# Patient Record
Sex: FEMALE | Race: WHITE | NOT HISPANIC OR LATINO | Employment: STUDENT | URBAN - METROPOLITAN AREA
[De-identification: names, ages, dates, MRNs, and addresses within clinical notes are randomized per-mention and may not be internally consistent; named-entity substitution may affect disease eponyms.]

---
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
## 2018-01-11 NOTE — PROGRESS NOTES
Assessment   1  Well adult exam (V70 0) (Z00 00)  2  Encounter for routine child health examination with abnormal findings (V20 2) (Z00 121)  3  Acute pharyngitis, unspecified etiology (462) (J02 9)  4  Need for meningococcal vaccination (V03 89) (Z23)  5  Acute streptococcal pharyngitis (034 0) (J02 0)1      1 Amended By: Leonardo Ferguson; Sep 11 2016 1:13 PM EST    Plan  Acute pharyngitis, unspecified etiology    · Start: Cefdinir 250 MG/5ML Oral Suspension Reconstituted; TAKE 6 ML TWICE DAILY  Acute pharyngitis, unspecified etiology, Sore throat    · Rapid StrepA- POC; Source:Throat; Status:Complete;   Done: [id number] 12:59PM  Need for meningococcal vaccination    · Administer: Trumenba Intramuscular Suspension Prefilled Syringe; INJECT 0 5  ML  Intramuscular; To Be Done: [id number]   · Administered: Trumenba Intramuscular Suspension Prefilled Syringe    Discussion/Summary    Impression:   No growth and development concerns  no medical problems  Trumemba given today  Patient is an 23year-old female here for a well visit  Her exam is normal except for a sore throat and swollen glands  She does have a history of "colonizing strep" and had been referred to ENT but had difficulty making an appointment  Her strep screen was positive in our office today, so I did treat her with Cefdinir for 10 days  She did request that the anabiotic be Liquid medicine  I also gave her the name to other ear nose and throat in [facility]  I did fill out her physical form  I discussed vaccinations with the patient and her mother and recommended the Trumemba as she has just started college and is at risk for meningitis  She will get the second Trumemba vaccine in 6 months  Possible side effects of new medications were reviewed with the patient/guardian today  Chief Complaint  patient presents for college physical  Is a freshmen at [employer] and is residing on campus   Patient also with c/o sore throat History of Present Illness  HM, 12-18 years Female (Brief):   General Health:   Dental hygiene: Good  Immunization status: Up to date  Caregiver concerns:   Menses: Menstrual history:  age at menarche was 13  The cycles have been regular  The patient is on an oral contraceptive pill  Nutrition/Elimination:   Sleep:  No sleep issues are reported  Behavior: The child's temperament is described as calm  Health Risks:   Childcare/School:   Sports Participation Questions:   HPI: Patient is here for well visit  Patient has had colonizing Strep in the past and now has a sore throat  Review of Systems    Constitutional: No complaints of fever or chills, feels well, no tiredness, no recent weight gain or loss  Eyes: No complaints of eye pain, no discharge, no eyesight problems, eyes do not itch, no red or dry eyes  ENT: sore throat  Cardiovascular: No complaints of chest pain, no palpitations, normal heart rate, no lower extremity edema  Respiratory: No complaints of cough, no shortness of breath, no wheezing, no leg claudication  Gastrointestinal: No complaints of abdominal pain, no nausea or vomiting, no constipation, no diarrhea or bloody stools  Genitourinary: No complaints of incontinence, no pelvic pain, no dysuria or dysmenorrhea, no abnormal vaginal bleeding or vaginal discharge  Musculoskeletal: No complaints of limb swelling or limb pain, no myalgias, no joint swelling or joint stiffness  Integumentary: No complaints of skin rash, no skin lesions or wounds, no itching, no breast pain, no breast lump  Neurological: No complaints of headache, no numbness or tingling, no confusion, no dizziness, no limb weakness, no convulsions or fainting, no difficulty walking  Psychiatric: No complaints of feeling depressed, no suicidal thoughts, no emotional problems, no anxiety, no sleep disturbances, no change in personality     Endocrine: No complaints of feeling weak, no muscle weakness, no deepening of voice, no hot flashes or proptosis  Active Problems   1  Birth control (V25 9) (Z30 9)    Past Medical History    · History of Abnormal urinalysis (791 9) (R82 90)   · History of Acute upper respiratory infection (465 9) (J06 9)   · History of Arthralgia (719 40) (M25 50)   · History of Blepharitis of right upper eyelid (373 00) (H01 001)   · History of Dysuria (788 1) (R30 0)   · History of Encounter for tuberculin skin test (V74 1) (Z11 1)   · History of Flu vaccine need (V04 81) (Z23)   · History of acne (V13 3) (Z87 2)   · History of acute pharyngitis (V12 69) (Z87 09)   · History of fatigue (V13 89) (U98 040)   · History of headache (V13 89) (E53 041)   · History of streptococcal pharyngitis (V12 09) (Z87 09)   · History of streptococcal pharyngitis (V12 09) (Z87 09)   · History of Hordeolum externum of right eye (373 11) (H00 013)   · History of Pain of lower leg, unspecified laterality (729 5) (M79 669)   · History of Symptoms involving urinary system (788 99) (R39 9)    Surgical History    · Denied: History Of Prior Surgery    Family History  Family History    · Family history of No Significant Family History    Social History    · Denied: History of Drug Use   · Never A Smoker    Current Meds  1  Ortho Tri-Cyclen (28) 0 18/0 215/0 25 MG-35 MCG Oral Tablet; Therapy: (Recorded:06Qdf6471) to Recorded    Allergies   1  No Known Drug Allergies    Vitals   Recorded: 11KIJ7660 81:00QF   Systolic 863, LUE   Diastolic 68, LUE   Heart Rate 56   Temperature 98 4 F   LMP 14Bwf3198   O2 Saturation 98   Height 5 ft 3 5 in   Weight 134 lb    BMI Calculated 23 36   BSA Calculated 1 64   BMI Percentile 70 %   2-20 Stature Percentile 39 %   2-20 Weight Percentile 65 %     Physical Exam    Constitutional - General appearance: No acute distress, well appearing and well nourished  Eyes - Conjunctiva and lids: No injection, edema or discharge   Pupils and irises: Equal, round, reactive to light bilaterally  Ears, Nose, Mouth, and Throat - External inspection of ears and nose: Normal without deformities or discharge  Otoscopic examination: Tympanic membranes gray, translucent with good bony landmarks and light reflex  Canals patent without erythema  Oropharynx: Abnormal  The posterior pharynx was erythematous, but did not have an exudate  Neck - Neck: Supple, symmetric, no masses  Thyroid: No thyromegaly  Pulmonary - Respiratory effort: Normal respiratory rate and rhythm, no increased work of breathing  Auscultation of lungs: Clear bilaterally  Cardiovascular - Auscultation of heart: Regular rate and rhythm, normal S1 and S2, no murmur  Carotid pulses: Normal, 2+ bilaterally  Pedal pulses: Normal, 2+ bilaterally  Examination of extremities for edema and/or varicosities: Normal    Abdomen - Abdomen: Normal bowel sounds, soft, non-tender, no masses  Lymphatic - Palpation of lymph nodes in neck: Abnormal  bilateral anterior cervical node enlargement  Musculoskeletal - Gait and station: Normal gait  Neurologic - Reflexes: Normal    Psychiatric - Orientation to person, place, and time: Normal  Mood and affect: Normal       Procedure    Procedure: Visual Acuity Test    Indication: routine screening  Inforrmation supplied by a Snellen chart  Results: 20/15 in both eyes without corrective device, 20/20 in the right eye without corrective device, 20/20 in the left eye without corrective device normal in both eyes        Signatures   Electronically signed by : Isabella Lesches, DO; Sep 11 2016  1:13PM EST                       (Author)

## 2018-01-12 VITALS
SYSTOLIC BLOOD PRESSURE: 126 MMHG | RESPIRATION RATE: 16 BRPM | HEART RATE: 93 BPM | WEIGHT: 137.5 LBS | BODY MASS INDEX: 23.47 KG/M2 | OXYGEN SATURATION: 99 % | DIASTOLIC BLOOD PRESSURE: 72 MMHG | TEMPERATURE: 98.4 F | HEIGHT: 64 IN

## 2018-01-12 NOTE — PROGRESS NOTES
Assessment    1  Encounter for preventive health examination (V70 0) (Z00 00)   2  Chronic bilateral low back pain without sciatica (724 2,338 29) (M54 5,G89 29)   3  Chronic bilateral thoracic back pain (724 1,338 29) (M54 6,G89 29)   4  Need for vaccination (V05 9) (Z23)    Plan  Chronic bilateral low back pain without sciatica, Chronic bilateral thoracic back pain    · * XR SPINE LUMBAR MINIMUM 4 VIEWS NON INJURY; Status:Active; Requested  for:24Nov2017;    · * XR SPINE THORACIC 3 VIEW; Status:Active; Requested for:24Nov2017;   Chronic bilateral low back pain without sciatica, Chronic bilateral thoracic back pain,  Need for vaccination    · (1) CBC/PLT/DIFF; Status:Active; Requested for:24Nov2017;    · (1) COMPREHENSIVE METABOLIC PANEL; Status:Active; Requested for:24Nov2017;    · (1) LIPID PANEL FASTING W DIRECT LDL REFLEX; Status:Active; Requested  for:24Nov2017;    · (1) TSH WITH FT4 REFLEX; Status:Active; Requested for:24Nov2017;   Need for vaccination    · Fluzone Quadrivalent Intramuscular Suspension; INJECT 0 5  ML  Intramuscular; To Be Done: [id number]    Discussion/Summary    22 yo presents today w/ mother today for PE  overall pt feels well  Her only prescription medication is oral birth control  Her examination today is within normal limits  I gave her an Rx for routine fasting blood work  Will call with results  Flu shot given today    Pt also c/o of low and mid back pain x a few mos  no radiculopathy  no trauma  taking ibu w/ some benefit  Upon examination, patient exhibits prominent paravertebral musculature in her thoracic and lumbar but spine  No scoliosis detected  Will sent for x-rays of lumbar and thoracic spine  Will also give exercise sheet for home  Will call with results  Possible refer to PT  The treatment plan was reviewed with the patient/guardian   The patient/guardian understands and agrees with the treatment plan      Chief Complaint  pt here for her yearly physical and a flu shot also is a re-vac for menactra      History of Present Illness  HPI: 22 yo presents today w/ mother today for PE  overall pt feels well  Pt also c/o of low and mid back pain x a few mos  no radiculopathy  no trauma  taking ibu w/ some benefit  Review of Systems    Constitutional: No fever, no chills, feels well, no tiredness, no recent weight gain or weight loss  Cardiovascular: No complaints of slow heart rate, no fast heart rate, no chest pain, no palpitations, no leg claudication, no lower extremity edema  Respiratory: No complaints of shortness of breath, no wheezing, no cough, no SOB on exertion, no orthopnea, no PND  Gastrointestinal: No complaints of abdominal pain, no constipation, no nausea or vomiting, no diarrhea, no bloody stools  Genitourinary: No complaints of dysuria, no incontinence, no pelvic pain, no dysmenorrhea, no vaginal discharge or bleeding  Musculoskeletal: as noted in HPI  Active Problems    1  Acute pharyngitis, unspecified etiology (462) (J02 9)   2  Birth control (V25 9) (Z30 9)   3  Depression screening (V79 0) (Z13 89)   4  Flu vaccine need (V04 81) (Z23)   5  Need for revaccination (V05 9) (Z23)   6   Well adult exam (V70 0) (Z00 00)    Past Medical History    · History of Abnormal urinalysis (791 9) (R82 90)   · History of Acute pharyngitis, unspecified etiology (462) (J02 9)   · History of Acute upper respiratory infection (465 9) (J06 9)   · History of Arthralgia (719 40) (M25 50)   · History of Blepharitis of right upper eyelid (373 00) (H01 001)   · History of Dysuria (788 1) (R30 0)   · History of Encounter for routine child health examination with abnormal findings (V20 2)  (Z00 121)   · History of Encounter for tuberculin skin test (V74 1) (Z11 1)   · History of Flu vaccine need (V04 81) (Z23)   · History of acne (V13 3) (Z87 2)   · History of acute pharyngitis (V12 69) (Z87 09)   · History of fatigue (V13 89) (V35 782)   · History of headache (V18 21) (D38 589)   · History of streptococcal pharyngitis (V12 09) (Z87 09)   · History of streptococcal pharyngitis (V12 09) (Z87 09)   · History of Hordeolum externum of right eye (373 11) (H00 013)   · History of Need for meningococcal vaccination (V03 89) (Z23)   · History of Pain of lower leg, unspecified laterality (729 5) (M79 669)   · History of Sore throat (462) (J02 9)   · History of Symptoms involving urinary system (788 99) (R39 9)    Surgical History    · Denied: History Of Prior Surgery    Family History  Mother    · Denied: Family history of substance abuse   · Family history of thyroiditis (V18 19) (Z83 49)   · Denied: Family history of Mental health problem  Father    · Denied: Family history of substance abuse   · Denied: Family history of Mental health problem  Family History    · Family history of No Significant Family History    Social History    · Always uses seat belt   · Daily caffeine consumption, 2-3 servings a day   · Denied: History of Drug Use   · Feels safe at home   · Never A Smoker   · No alcohol use    Current Meds   1  Ortho Tri-Cyclen (28) 0 18/0 215/0 25 MG-35 MCG Oral Tablet; Therapy: (Recorded:44Nxj8506) to Recorded    Allergies    1  No Known Drug Allergies    Physical Exam    Constitutional   General appearance: No acute distress, well appearing and well nourished  Ears, Nose, Mouth, and Throat   Oropharynx: Normal with no erythema, edema, exudate or lesions  Pulmonary   Respiratory effort: No increased work of breathing or signs of respiratory distress  Auscultation of lungs: Clear to auscultation  Cardiovascular   Palpation of heart: Normal PMI, no thrills  Auscultation of heart: Normal rate and rhythm, normal S1 and S2, without murmurs  Examination of extremities for edema and/or varicosities: Normal     Abdomen   Abdomen: Non-tender, no masses  Liver and spleen: No hepatomegaly or splenomegaly      Lymphatic   Palpation of lymph nodes in neck: No lymphadenopathy  Musculoskeletal   Gait and station: Normal     Inspection/palpation of joints, bones, and muscles: Abnormal   Lumbar and thoracic spine: Prominent paravertebral musculature  Essentially full  Range of motion negative straight leg raising  Signatures   Electronically signed by :  Igor Reyna DO; Nov 24 2017 11:33AM EST                       (Author)

## 2018-01-22 VITALS
WEIGHT: 132.13 LBS | OXYGEN SATURATION: 98 % | DIASTOLIC BLOOD PRESSURE: 80 MMHG | HEIGHT: 62 IN | RESPIRATION RATE: 17 BRPM | TEMPERATURE: 98.2 F | BODY MASS INDEX: 24.31 KG/M2 | HEART RATE: 92 BPM | SYSTOLIC BLOOD PRESSURE: 118 MMHG

## 2018-01-23 NOTE — RESULT NOTES
Verified Results  * XR SPINE LUMBAR MINIMUM 4 VIEWS NON INJURY [id number] 01:44PM Wendie Gray Order Number: [id number]     Test Name Result Flag Reference   XR SPINE LUMBAR MINIMUM 4 VIEWS (Report)     LUMBAR SPINE     INDICATION: M54 5: Low back pain   M54 6: Pain in thoracic spine  History taken directly from the electronic ordering system  COMPARISON: None     VIEWS: AP, lateral, bilateral oblique and coned down projections     IMAGES: 5     FINDINGS:     Alignment is unremarkable  Mild lumbar dextroscoliosis  There is no radiographic evidence of acute fracture or destructive osseous lesion  No significant lumbar degenerative change noted  Visualized soft tissues appear unremarkable  IMPRESSION:     Essentially normal study (mild lumbar dextroscoliosis)  Workstation performed: [id number]     Signed by:   Marcela Mahan MD   12/22/17     * XR SPINE THORACIC 3 VIEW [id number] 01:44PM Wendie Gray Order Number: [id number]     Test Name Result Flag Reference   XR SPINE THORACIC 3 VW (Report)     THORACIC SPINE     INDICATION: M54 5: Low back pain   M54 6: Pain in thoracic spine  History taken directly from the electronic ordering system  COMPARISON: None     VIEWS: AP, lateral and coned-down projections     IMAGES: 3     FINDINGS:     Thoracic vertebrae demonstrate normal stature and alignment  Mild thoracolumbar levoscoliosis  There is no fracture or pathologic bone lesion  There is no displacement of the paraspinal line  The pedicles are intact  IMPRESSION:     Thoracolumbar levoscoliosis  Workstation performed: [id number]     Signed by:   Marcela Mahan MD   12/22/17

## 2018-01-24 VITALS
HEART RATE: 90 BPM | WEIGHT: 130.19 LBS | BODY MASS INDEX: 23.81 KG/M2 | TEMPERATURE: 98.6 F | DIASTOLIC BLOOD PRESSURE: 82 MMHG | SYSTOLIC BLOOD PRESSURE: 118 MMHG | OXYGEN SATURATION: 98 % | RESPIRATION RATE: 16 BRPM

## 2018-03-07 NOTE — PROGRESS NOTES
History of Present Illness    Revaccination   Vaccine Information: Vaccine(s) Given (names): Menactra  Unable to reach by phone  Spoke with family regarding vaccine out of temperature range and risks and benefits of revaccination  Action(s): Pt will be revaccinated  Appointment scheduled: [id number]  Pt called (attempt 1): [id number] 1400 JLR  Pt called (attempt 2): [id number] 4075 JLR  LMOM  Pt called (attempt 3): [id number]  LMOM  Letter Sent (Regular and Certified): [id number] [facility]  No Show Appt(s): [id number]  Revaccination Completed: [id number]  Active Problems    1  Birth control (V25 9) (Z30 9)   2  Flu vaccine need (V04 81) (Z23)   3  Need for revaccination (V05 9) (Z23)   4  Well adult exam (V70 0) (Z00 00)    Immunizations  DTP/DTaP --- Madhavi Alejandre: 72-IJW-0826; Series2: 1998; Hemalatha Ro: 12-Dic-9790Bpftlxj Carter:  08-Oct-2001; Series5: 17-Sep-2003   Hepatitis B --- Madhavi Forte: 1998; Series2: 1998; Series3: 07-Jan-1999   HIB --- Madhavi Forte: 1998; Georgia Hall: 19-QGT-4637; Series3: 1998   HPV --- Madhavi Forte: 22-May-2012; [place] Hall: 24-Jul-2012; Series3: 24-Jan-2013   Influenza --- Madhavi Forte: 24-Jan-2013; [place] Hall: 10-Nov-2014; Hemalatha Ro: 27-Nov-2015; Series4:  26-Nov-2016   Meningococcal --- Series1: 27-Nov-2009; [place] Hall: 11-Jul-2016   MMR --- Series1: 08-Oct-1999; [place] Hall: 02-Jul-2002   Polio --- Series1: 1998; Series2: 1998; Hemalatha Ro: 58-Ivg-6902Zxhczul Carter: 02-Jul-2002   PPD --- Madhavi Forte: 10-Nov-2014   Tdap --- Series1: 27-Nov-2009   Trumenba Intramuscular Suspension Prefilled Syringe --- Madhavi Fortparminder: 45-Kxl-1088Rcui Ambar:  12-Sep-2016   Varicella --- Series1: 13-Apr-1999; Series2: 04-Oct-1999     Current Meds   1  Ortho Tri-Cyclen (28) 0 18/0 215/0 25 MG-35 MCG Oral Tablet    Allergies    1   No Known Drug Allergies    Signatures   Electronically signed by : Shaune Curling, ; Dec 27 2017 10:29AM EST                       (Author)

## 2018-11-02 ENCOUNTER — TELEPHONE (OUTPATIENT)
Dept: FAMILY MEDICINE CLINIC | Facility: CLINIC | Age: 18
End: 2018-11-02

## 2018-11-02 DIAGNOSIS — M41.9 SCOLIOSIS, UNSPECIFIED SCOLIOSIS TYPE, UNSPECIFIED SPINAL REGION: Primary | ICD-10-CM

## 2018-11-02 PROBLEM — M54.6 CHRONIC BILATERAL THORACIC BACK PAIN: Status: ACTIVE | Noted: 2017-11-24

## 2018-11-02 PROBLEM — G89.29 CHRONIC BILATERAL THORACIC BACK PAIN: Status: ACTIVE | Noted: 2017-11-24

## 2018-11-02 NOTE — TELEPHONE ENCOUNTER
Pt's mom, Norman Gil, called (consent ok) stating pt's back is still bothering her and she knows she is overdue for the 6 month follow up x-ray  Can you please put a script in for pt to go for an X-ray the Wednesday before Thanksgiving while she is home on Thanksgiving break? Norman Gil is asking for a call after x-ray's to let her know if there was a change since x-ray (Dec 2017), and if she needs to come in for a follow up appointment  Norman Gil stated she can come in if needed over winter break   Norman Gil can be reached at [phone]

## 2018-11-02 NOTE — TELEPHONE ENCOUNTER
Call patient's mother, x-ray orders placed    I would like to see Jeanette Montesinos back during her Hi break for recheck

## 2018-11-02 NOTE — TELEPHONE ENCOUNTER
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

## 2019-07-09 ENCOUNTER — TELEPHONE (OUTPATIENT)
Dept: FAMILY MEDICINE CLINIC | Facility: CLINIC | Age: 19
End: 2019-07-09

## 2019-07-09 NOTE — TELEPHONE ENCOUNTER
Call patient's mother, we do not stock typhoid vaccine  (we usually only prescribed pills for this)  If she needs this shot, she needs to get it at the [facility] travel clinic (please give mother phone number for travel clinic)

## 2019-07-09 NOTE — TELEPHONE ENCOUNTER
Denis Law is going to [facility] and is coming in to see you on 8/2/19 for a Hep A adult vaccine, a typhoid injection ( has to be the injection, not the pill, because [place] has gastritis)  And she has to be up to date on all vaccines  Her Mom just wants to be sure we have all of these available on the date of her appointment   x

## 2019-07-10 ENCOUNTER — TELEPHONE (OUTPATIENT)
Dept: FAMILY MEDICINE CLINIC | Facility: CLINIC | Age: 19
End: 2019-07-10

## 2019-07-10 NOTE — TELEPHONE ENCOUNTER
Called and left a message on the home phone letting [facility] and her Mom know that the Typhoid vaccine has to be given at the [address] [place] [address] travel [facility], and I gave them the ph #  Also informed them that the rest of the vaccines  Can be given here at her upcoming appt with Dr Glendy Whittaker

## 2019-08-05 ENCOUNTER — OFFICE VISIT (OUTPATIENT)
Dept: FAMILY MEDICINE CLINIC | Facility: CLINIC | Age: 19
End: 2019-08-05
Payer: COMMERCIAL

## 2019-08-05 VITALS
BODY MASS INDEX: 22.24 KG/M2 | DIASTOLIC BLOOD PRESSURE: 70 MMHG | OXYGEN SATURATION: 96 % | RESPIRATION RATE: 19 BRPM | WEIGHT: 125.5 LBS | HEART RATE: 94 BPM | SYSTOLIC BLOOD PRESSURE: 110 MMHG | HEIGHT: 63 IN | TEMPERATURE: 97.6 F

## 2019-08-05 DIAGNOSIS — Z00.00 WELL ADULT EXAM: Primary | ICD-10-CM

## 2019-08-05 PROBLEM — G89.29 CHRONIC BILATERAL LOW BACK PAIN WITHOUT SCIATICA: Status: ACTIVE | Noted: 2017-11-24

## 2019-08-05 PROBLEM — M54.50 CHRONIC BILATERAL LOW BACK PAIN WITHOUT SCIATICA: Status: ACTIVE | Noted: 2017-11-24

## 2019-08-05 PROCEDURE — 99395 PREV VISIT EST AGE 18-39: CPT | Performed by: FAMILY MEDICINE

## 2019-08-05 RX ORDER — NORGESTIMATE AND ETHINYL ESTRADIOL
1 KIT DAILY
Refills: 0 | COMMUNITY
Start: 2019-07-10

## 2019-08-05 NOTE — PROGRESS NOTES
Assessment/Plan:  1  Well adult exam  Patient is a 57-year-old female seen for well visit  Her physical exam is normal   She will be seeing her gyn next week  She is on the birth control pill  Her menses are regular  She denies smoking  She does drink alcohol  We did discuss not to overdo  She does exercise regularly  She feels that she has a healthy diet  She is traveling to [facility] for 4 months to study abroad  She has an appointment at the travel clinic tomorrow  I did remind her that she will need an Adacel along with the immunizations recommended for her travel  Diagnoses and all orders for this visit:    Well adult exam    Other orders  -     Kelsey Olivarez 0 18/0 215/0 25 MG-25 MCG per tablet; Take 1 tablet by mouth daily          Subjective:   Chief Complaint   Patient presents with    Physical Exam     annual physical        Patient ID: Sammy Rosa is a 22 y o  female  Patient is here for well visit  She is feeling well  Patient is leaving for [facility] in 11 days for a study abroad  Drinks a lot of green juices  Her family history is positive for her dad having sarcoma  Her mom had thyroiditis  She does note that she gets frequent sore throats and colds and plans to see ENT when she returns from [facility] to have a tonsillectomy  The following portions of the patient's history were reviewed and updated as appropriate: allergies, current medications, past family history, past medical history, past social history, past surgical history and problem list     Review of Systems   Constitutional: Negative for chills and fever  Had fever and sore throat a few days ago with upper respiratory symptoms  HENT: Negative for congestion and sore throat  Respiratory: Negative for chest tightness  Cardiovascular: Negative for chest pain and palpitations  Gastrointestinal: Negative for abdominal pain, constipation, diarrhea and nausea     Genitourinary: Negative for difficulty urinating  Skin: Negative  Neurological: Negative for dizziness and headaches  Psychiatric/Behavioral: Negative  Objective:      /70 (BP Location: Right arm, Patient Position: Sitting, Cuff Size: Adult)   Pulse 94   Temp 97 6 °F (36 4 °C) (Tympanic)   Resp 19   Ht 5' 3 39" (1 61 m)   Wt 56 9 kg (125 lb 8 oz)   LMP 07/22/2019 (Within Days)   SpO2 96%   Breastfeeding? No   BMI 21 96 kg/m²          Physical Exam   Constitutional: She is oriented to person, place, and time  She appears well-developed and well-nourished  No distress  HENT:   Head: Normocephalic  Right Ear: Tympanic membrane normal    Left Ear: Tympanic membrane normal    Nose: Nose normal    Mouth/Throat: Oropharynx is clear and moist and mucous membranes are normal    Eyes: Pupils are equal, round, and reactive to light  Neck: Normal range of motion  Carotid bruit is not present  No thyromegaly present  Cardiovascular: Normal rate, regular rhythm, normal heart sounds and intact distal pulses  Pulmonary/Chest: Effort normal and breath sounds normal    Abdominal: Soft  Bowel sounds are normal  There is no tenderness  Musculoskeletal: Normal range of motion  She exhibits no edema  Lymphadenopathy:     She has no cervical adenopathy  Neurological: She is alert and oriented to person, place, and time  She has normal reflexes  Skin: Skin is warm and dry  Psychiatric: She has a normal mood and affect  Nursing note and vitals reviewed

## 2019-08-07 ENCOUNTER — TELEPHONE (OUTPATIENT)
Dept: INFECTIOUS DISEASES | Facility: CLINIC | Age: 19
End: 2019-08-07

## 2019-08-08 ENCOUNTER — OFFICE VISIT (OUTPATIENT)
Dept: INFECTIOUS DISEASES | Facility: CLINIC | Age: 19
End: 2019-08-08

## 2019-08-08 VITALS
RESPIRATION RATE: 16 BRPM | TEMPERATURE: 98 F | BODY MASS INDEX: 22.15 KG/M2 | HEIGHT: 63 IN | WEIGHT: 125 LBS | DIASTOLIC BLOOD PRESSURE: 70 MMHG | HEART RATE: 71 BPM | SYSTOLIC BLOOD PRESSURE: 120 MMHG

## 2019-08-08 DIAGNOSIS — Z23 ENCOUNTER FOR VACCINATION: ICD-10-CM

## 2019-08-08 DIAGNOSIS — Z71.84 TRAVEL ADVICE ENCOUNTER: Primary | ICD-10-CM

## 2019-08-08 PROCEDURE — 90471 IMMUNIZATION ADMIN: CPT

## 2019-08-08 PROCEDURE — 99401 PREV MED CNSL INDIV APPRX 15: CPT | Performed by: INTERNAL MEDICINE

## 2019-08-08 PROCEDURE — 90714 TD VACC NO PRESV 7 YRS+ IM: CPT

## 2019-08-08 PROCEDURE — 90472 IMMUNIZATION ADMIN EACH ADD: CPT

## 2019-08-08 PROCEDURE — 90691 TYPHOID VACCINE IM: CPT

## 2019-08-08 PROCEDURE — 90632 HEPA VACCINE ADULT IM: CPT

## 2019-08-08 NOTE — PROGRESS NOTES
Travel Clinic    Patient is traveling to countries or areas within countries where immunizations are required or recommended:   [place] for 3 months  Non malaria areas  Patient states they will visit underdeveloped areas with poor sanitation Yes/No: Yes   Patient requires malaria prophylaxis Yes/No: No    No orders of the defined types were placed in this encounter        Patient instructed how to take medications Yes/No: Yes  Patient warned about side effects Yes/No: Yes  Patient declined Yes/No: No

## 2020-04-10 ENCOUNTER — OFFICE VISIT (OUTPATIENT)
Dept: FAMILY MEDICINE CLINIC | Facility: CLINIC | Age: 20
End: 2020-04-10
Payer: COMMERCIAL

## 2020-04-10 VITALS
WEIGHT: 134 LBS | BODY MASS INDEX: 23.74 KG/M2 | SYSTOLIC BLOOD PRESSURE: 110 MMHG | HEIGHT: 63 IN | OXYGEN SATURATION: 97 % | HEART RATE: 94 BPM | TEMPERATURE: 97.6 F | RESPIRATION RATE: 16 BRPM | DIASTOLIC BLOOD PRESSURE: 70 MMHG

## 2020-04-10 DIAGNOSIS — B37.3 VAGINA, CANDIDIASIS: Primary | ICD-10-CM

## 2020-04-10 PROCEDURE — 99213 OFFICE O/P EST LOW 20 MIN: CPT | Performed by: FAMILY MEDICINE

## 2020-04-10 PROCEDURE — 1036F TOBACCO NON-USER: CPT | Performed by: FAMILY MEDICINE

## 2020-04-10 PROCEDURE — 3008F BODY MASS INDEX DOCD: CPT | Performed by: FAMILY MEDICINE

## 2020-04-10 RX ORDER — FLUCONAZOLE 150 MG/1
150 TABLET ORAL ONCE
Qty: 1 TABLET | Refills: 1 | Status: SHIPPED | OUTPATIENT
Start: 2020-04-10 | End: 2020-04-10

## 2020-05-01 ENCOUNTER — OFFICE VISIT (OUTPATIENT)
Dept: FAMILY MEDICINE CLINIC | Facility: CLINIC | Age: 20
End: 2020-05-01
Payer: COMMERCIAL

## 2020-05-01 VITALS
SYSTOLIC BLOOD PRESSURE: 110 MMHG | HEIGHT: 63 IN | RESPIRATION RATE: 16 BRPM | DIASTOLIC BLOOD PRESSURE: 70 MMHG | TEMPERATURE: 99 F | OXYGEN SATURATION: 98 % | HEART RATE: 80 BPM | WEIGHT: 133 LBS | BODY MASS INDEX: 23.57 KG/M2

## 2020-05-01 DIAGNOSIS — R30.0 DYSURIA: Primary | ICD-10-CM

## 2020-05-01 DIAGNOSIS — N89.8 VAGINAL DISCHARGE: ICD-10-CM

## 2020-05-01 PROCEDURE — 3008F BODY MASS INDEX DOCD: CPT | Performed by: FAMILY MEDICINE

## 2020-05-01 PROCEDURE — 99214 OFFICE O/P EST MOD 30 MIN: CPT | Performed by: FAMILY MEDICINE

## 2020-05-01 PROCEDURE — 1036F TOBACCO NON-USER: CPT | Performed by: FAMILY MEDICINE

## 2020-05-05 LAB
C TRACH RRNA SPEC QL NAA+PROBE: DETECTED
N GONORRHOEA RRNA SPEC QL NAA+PROBE: NOT DETECTED

## 2020-05-06 DIAGNOSIS — A74.9 CHLAMYDIA: Primary | ICD-10-CM

## 2020-05-06 RX ORDER — DOXYCYCLINE HYCLATE 100 MG/1
100 CAPSULE ORAL EVERY 12 HOURS SCHEDULED
Qty: 20 CAPSULE | Refills: 0 | Status: SHIPPED | OUTPATIENT
Start: 2020-05-06 | End: 2020-05-16

## 2021-04-29 ENCOUNTER — OFFICE VISIT (OUTPATIENT)
Dept: FAMILY MEDICINE CLINIC | Facility: CLINIC | Age: 21
End: 2021-04-29
Payer: COMMERCIAL

## 2021-04-29 VITALS
OXYGEN SATURATION: 99 % | WEIGHT: 121 LBS | HEART RATE: 90 BPM | DIASTOLIC BLOOD PRESSURE: 74 MMHG | HEIGHT: 63 IN | SYSTOLIC BLOOD PRESSURE: 116 MMHG | TEMPERATURE: 98 F | BODY MASS INDEX: 21.44 KG/M2

## 2021-04-29 DIAGNOSIS — R10.84 GENERALIZED ABDOMINAL PAIN: Primary | ICD-10-CM

## 2021-04-29 DIAGNOSIS — R19.7 DIARRHEA, UNSPECIFIED TYPE: ICD-10-CM

## 2021-04-29 DIAGNOSIS — L70.0 ACNE VULGARIS: ICD-10-CM

## 2021-04-29 PROCEDURE — 3008F BODY MASS INDEX DOCD: CPT | Performed by: FAMILY MEDICINE

## 2021-04-29 PROCEDURE — 99213 OFFICE O/P EST LOW 20 MIN: CPT | Performed by: FAMILY MEDICINE

## 2021-04-29 PROCEDURE — 1036F TOBACCO NON-USER: CPT | Performed by: FAMILY MEDICINE

## 2021-04-29 RX ORDER — NORGESTIMATE AND ETHINYL ESTRADIOL 7DAYSX3 LO
1 KIT ORAL DAILY
COMMUNITY
Start: 2021-02-22

## 2021-04-29 NOTE — PROGRESS NOTES
Assessment/Plan:  Patient with concern for celiac disease  Will check blood work  Diagnoses and all orders for this visit:    Generalized abdominal pain  -     Celiac Disease Diagnostic Panel; Future  -     Celiac Disease Diagnostic Panel    Diarrhea, unspecified type  -     Celiac Disease Diagnostic Panel; Future  -     Celiac Disease Diagnostic Panel    Acne vulgaris    Other orders  -     norgestimate-ethinyl estradiol (ORTHO TRI-CYCLEN LO) 0 18/0 215/0 25 MG-25 MCG per tablet; Take 1 tablet by mouth daily          Subjective:   No chief complaint on file  Patient ID: Luiz Park is a 19 y o  female  Patient has been having skin issues  Had a 5 strand hair test - high sensitivity to gluten foods  Feels that she has IBS symptoms since college - increased cramps, diarrhea and has no tried to cut out gluten  Acne and irritable bowel have improved when cuts out gluten  Milk products had a lower sensitivity and she can eat these without two much symptoms  The following portions of the patient's history were reviewed and updated as appropriate: allergies, current medications, past family history, past medical history, past social history, past surgical history and problem list     Review of Systems   Constitutional: Negative for chills and fever  HENT: Negative for congestion and sore throat  Respiratory: Negative for chest tightness  Cardiovascular: Negative for chest pain and palpitations  Gastrointestinal: Positive for abdominal pain and diarrhea  Negative for constipation and nausea  Genitourinary: Negative for difficulty urinating  Skin: Positive for rash  Neurological: Negative for dizziness and headaches  Psychiatric/Behavioral: Negative            Objective:      /74 (BP Location: Right arm, Patient Position: Sitting)   Pulse 90   Temp 98 °F (36 7 °C) (Tympanic)   Ht 5' 3" (1 6 m)   Wt 54 9 kg (121 lb)   LMP 04/19/2021   SpO2 99%   BMI 21 43 kg/m² Physical Exam  Vitals signs and nursing note reviewed  Constitutional:       General: She is not in acute distress  HENT:      Head: Normocephalic  Right Ear: Tympanic membrane normal       Left Ear: Tympanic membrane normal    Neck:      Thyroid: No thyromegaly  Cardiovascular:      Rate and Rhythm: Normal rate and regular rhythm  Heart sounds: Normal heart sounds  Pulmonary:      Effort: Pulmonary effort is normal       Breath sounds: Normal breath sounds  Abdominal:      General: Abdomen is flat  Bowel sounds are normal       Tenderness: There is no abdominal tenderness  Lymphadenopathy:      Cervical: No cervical adenopathy  Skin:     General: Skin is warm and dry  Neurological:      Mental Status: She is alert and oriented to person, place, and time     Psychiatric:         Mood and Affect: Mood normal

## 2021-05-11 LAB
ENDOMYSIUM IGA SER QL: NEGATIVE
IGA SERPL-MCNC: 137 MG/DL (ref 87–352)
TTG IGA SER-ACNC: <2 U/ML (ref 0–3)

## 2021-07-12 ENCOUNTER — OFFICE VISIT (OUTPATIENT)
Dept: FAMILY MEDICINE CLINIC | Facility: CLINIC | Age: 21
End: 2021-07-12
Payer: COMMERCIAL

## 2021-07-12 VITALS
HEIGHT: 63 IN | HEART RATE: 89 BPM | BODY MASS INDEX: 21.09 KG/M2 | TEMPERATURE: 97.7 F | DIASTOLIC BLOOD PRESSURE: 70 MMHG | RESPIRATION RATE: 14 BRPM | SYSTOLIC BLOOD PRESSURE: 110 MMHG | OXYGEN SATURATION: 99 % | WEIGHT: 119 LBS

## 2021-07-12 DIAGNOSIS — R63.4 WEIGHT LOSS: Primary | ICD-10-CM

## 2021-07-12 PROBLEM — B37.3 VAGINA, CANDIDIASIS: Status: RESOLVED | Noted: 2020-04-10 | Resolved: 2021-07-12

## 2021-07-12 PROBLEM — N89.8 VAGINAL DISCHARGE: Status: RESOLVED | Noted: 2020-05-01 | Resolved: 2021-07-12

## 2021-07-12 PROCEDURE — 99213 OFFICE O/P EST LOW 20 MIN: CPT | Performed by: FAMILY MEDICINE

## 2021-07-12 PROCEDURE — 3008F BODY MASS INDEX DOCD: CPT | Performed by: FAMILY MEDICINE

## 2021-07-12 PROCEDURE — 1036F TOBACCO NON-USER: CPT | Performed by: FAMILY MEDICINE

## 2021-07-12 PROCEDURE — 3725F SCREEN DEPRESSION PERFORMED: CPT | Performed by: FAMILY MEDICINE

## 2021-07-12 NOTE — PROGRESS NOTES
[facility]      NAME: Desi More  AGE: 19 y o  SEX: female  : 2000   MRN: [id number]    DATE: 2021  TIME: 8:10 PM    Assessment and Plan     Problem List Items Addressed This Visit     None      Visit Diagnoses     Weight loss    -  Primary    Relevant Orders    CBC and differential    Comprehensive metabolic panel    TSH, 3rd generation with Free T4 reflex      Patient presents to discuss her weight  She has lost approximately 10 lbs since starting gluten free diet  Overall she feels well without any complaints  ROS negative her exam today is unremarkable  Recommend checking CBC, CMP, TSH  If normal, then no further workup is needed at this time  But I did advise patient that if she continued to lose more weight, that she should stop gluten free diet  Will call w/ lab results      Return to office in: prn    Chief Complaint     Chief Complaint   Patient presents with    discuss weigh loss       History of Present Illness       Patient presents to discuss her weight  She has lost approximately 10 lbs  Since starting gluten free diet  Overall she feels well without any complaints  The following portions of the patient's history were reviewed and updated as appropriate: allergies, current medications, past family history, past medical history, past social history, past surgical history and problem list     Review of Systems   Review of Systems   Constitutional: Negative  HENT: Negative  Respiratory: Negative  Cardiovascular: Negative  Gastrointestinal: Negative  Genitourinary: Negative  Musculoskeletal: Negative  Neurological: Negative  Hematological: Negative          Active Problem List     Patient Active Problem List   Diagnosis    Chronic bilateral thoracic back pain    Scoliosis    Chronic bilateral low back pain without sciatica       Objective   /70 (BP Location: Right arm, Patient Position: Sitting, Cuff Size: Adult)   Pulse 89   Temp 97 7 °F (36 5 °C) (Temporal)   Resp 14   Ht 5' 2 99" (1 6 m)   Wt 54 kg (119 lb)   LMP 06/12/2021   SpO2 99%   BMI 21 09 kg/m²     Physical Exam  Cardiovascular:      Rate and Rhythm: Normal rate and regular rhythm  Heart sounds: Normal heart sounds  Comments: Carotids: no bruits  Ext: no edema  Pulmonary:      Effort: Pulmonary effort is normal  No respiratory distress  Breath sounds: No wheezing or rales  Psychiatric:         Behavior: Behavior normal          Thought Content:  Thought content normal          Pertinent Laboratory/Diagnostic Studies:  celiac panel    Current Medications     Current Outpatient Medications:     norgestimate-ethinyl estradiol (ORTHO TRI-CYCLEN LO) 0 18/0 215/0 25 MG-25 MCG per tablet, Take 1 tablet by mouth daily, Disp: , Rfl:     TRI-LO-MIRNA 0 18/0 215/0 25 MG-25 MCG per tablet, Take 1 tablet by mouth daily (Patient not taking: Reported on 7/12/2021), Disp: , Rfl: 0    Health Maintenance     Health Maintenance   Topic Date Due    Hepatitis C Screening  Never done    HIV Screening  Never done    Chlamydia Screening  Never done    Cervical Cancer Screening  Never done    Annual Physical  08/05/2020    Influenza Vaccine (1) 09/01/2021    Depression Screening PHQ  07/12/2022    BMI: Adult  07/12/2022    DTaP,Tdap,and Td Vaccines (8 - Td or Tdap) 08/08/2029    Hepatitis B Vaccine  Completed    IPV Vaccine  Completed    HPV Vaccine  Completed    COVID-19 Vaccine  Completed    Pneumococcal Vaccine: Pediatrics (0 to 5 Years) and At-Risk Patients (6 to 59 Years)  Aged Out    HIB Vaccine  Aged Out    Hepatitis A Vaccine  Aged Out    Meningococcal ACWY Vaccine  Aged [employer] History   Administered Date(s) Administered    DTaP 5 1998, 1998, 01/07/1999, 10/08/2001, 09/17/2003    HPV Quadrivalent 05/22/2012, 07/24/2012, 01/24/2013    HPV9 12/16/2019    Hep A, adult 08/08/2019    Hep B, adult 1998, 1998, 01/07/1999    Hib (PRP-OMP) 1998, 1998, 1998    INFLUENZA 11/07/2018, 12/16/2019    IPV 1998, 1998, 10/08/1999, 07/02/2002    Influenza Quadrivalent Recombinant Preservative Free IM 10/24/2020    Influenza Quadrivalent, 6-35 Months IM 11/10/2014, 11/27/2015, 11/26/2016, 11/24/2017    Influenza, seasonal, injectable 01/24/2013    MMR 10/08/1999, 07/02/2002    Meningococcal B, Recombinant (Lisa Flight) 09/11/2016, 09/12/2016    Meningococcal, Unknown Serogroups 11/27/2009, 07/11/2016, 11/24/2017    Sars-cov-2 / Covid-19 vector-nr, [id number] vaccine Jaylen Jasmeet / [employer]) 04/08/2021    TD (adult) Preservative Free 08/08/2019    Tdap 11/27/2009    Tuberculin Skin Test-PPD Intradermal 11/10/2014    Typhoid, ViCPs 08/08/2019    Varicella 04/13/1999, 10/04/1999       Penny Gum, DO  [facility]

## 2021-07-18 LAB
ALBUMIN SERPL-MCNC: 4.3 G/DL (ref 3.6–5.1)
ALBUMIN/GLOB SERPL: 1.7 (CALC) (ref 1–2.5)
ALP SERPL-CCNC: 56 U/L (ref 31–125)
ALT SERPL-CCNC: 15 U/L (ref 6–29)
AST SERPL-CCNC: 21 U/L (ref 10–30)
BASOPHILS # BLD AUTO: 22 CELLS/UL (ref 0–200)
BASOPHILS NFR BLD AUTO: 0.4 %
BILIRUB SERPL-MCNC: 0.3 MG/DL (ref 0.2–1.2)
BUN SERPL-MCNC: 9 MG/DL (ref 7–25)
BUN/CREAT SERPL: NORMAL (CALC) (ref 6–22)
CALCIUM SERPL-MCNC: 9 MG/DL (ref 8.6–10.2)
CHLORIDE SERPL-SCNC: 104 MMOL/L (ref 98–110)
CO2 SERPL-SCNC: 28 MMOL/L (ref 20–32)
CREAT SERPL-MCNC: 0.76 MG/DL (ref 0.5–1.1)
EOSINOPHIL # BLD AUTO: 110 CELLS/UL (ref 15–500)
EOSINOPHIL NFR BLD AUTO: 2 %
ERYTHROCYTE [DISTWIDTH] IN BLOOD BY AUTOMATED COUNT: 12.5 % (ref 11–15)
GLOBULIN SER CALC-MCNC: 2.6 G/DL (CALC) (ref 1.9–3.7)
GLUCOSE SERPL-MCNC: 84 MG/DL (ref 65–99)
HCT VFR BLD AUTO: 39.3 % (ref 35–45)
HGB BLD-MCNC: 12.4 G/DL (ref 11.7–15.5)
LYMPHOCYTES # BLD AUTO: 2233 CELLS/UL (ref 850–3900)
LYMPHOCYTES NFR BLD AUTO: 40.6 %
MCH RBC QN AUTO: 28.3 PG (ref 27–33)
MCHC RBC AUTO-ENTMCNC: 31.6 G/DL (ref 32–36)
MCV RBC AUTO: 89.7 FL (ref 80–100)
MONOCYTES # BLD AUTO: 534 CELLS/UL (ref 200–950)
MONOCYTES NFR BLD AUTO: 9.7 %
NEUTROPHILS # BLD AUTO: 2602 CELLS/UL (ref 1500–7800)
NEUTROPHILS NFR BLD AUTO: 47.3 %
PLATELET # BLD AUTO: 317 THOUSAND/UL (ref 140–400)
PMV BLD REES-ECKER: 9.5 FL (ref 7.5–12.5)
POTASSIUM SERPL-SCNC: 4.4 MMOL/L (ref 3.5–5.3)
PROT SERPL-MCNC: 6.9 G/DL (ref 6.1–8.1)
RBC # BLD AUTO: 4.38 MILLION/UL (ref 3.8–5.1)
SL AMB EGFR AFRICAN AMERICAN: 128 ML/MIN/1.73M2
SL AMB EGFR NON AFRICAN AMERICAN: 111 ML/MIN/1.73M2
SODIUM SERPL-SCNC: 138 MMOL/L (ref 135–146)
TSH SERPL-ACNC: 2.34 MIU/L
WBC # BLD AUTO: 5.5 THOUSAND/UL (ref 3.8–10.8)

## 2021-07-19 ENCOUNTER — TELEPHONE (OUTPATIENT)
Dept: FAMILY MEDICINE CLINIC | Facility: CLINIC | Age: 21
End: 2021-07-19

## 2021-07-19 NOTE — TELEPHONE ENCOUNTER
----- Message from Eliceo East DO sent at 7/18/2021 10:28 AM EDT -----  Call patient, her labs were normal   Normal blood count, blood sugar 84, normal kidney and liver function, normal thyroid    No further workup is needed at this time, but if she continues to lose weight, I would recommend that she discontinue her gluten free diet